# Patient Record
Sex: FEMALE | Race: OTHER
[De-identification: names, ages, dates, MRNs, and addresses within clinical notes are randomized per-mention and may not be internally consistent; named-entity substitution may affect disease eponyms.]

---

## 2020-11-02 ENCOUNTER — HOSPITAL ENCOUNTER (EMERGENCY)
Dept: HOSPITAL 72 - EMR | Age: 60
Discharge: HOME | End: 2020-11-02
Payer: MEDICARE

## 2020-11-02 VITALS — BODY MASS INDEX: 36.8 KG/M2 | HEIGHT: 62 IN | WEIGHT: 200 LBS

## 2020-11-02 VITALS — DIASTOLIC BLOOD PRESSURE: 73 MMHG | SYSTOLIC BLOOD PRESSURE: 131 MMHG

## 2020-11-02 VITALS — SYSTOLIC BLOOD PRESSURE: 140 MMHG | DIASTOLIC BLOOD PRESSURE: 83 MMHG

## 2020-11-02 DIAGNOSIS — N83.202: ICD-10-CM

## 2020-11-02 DIAGNOSIS — K57.92: Primary | ICD-10-CM

## 2020-11-02 DIAGNOSIS — Z99.2: ICD-10-CM

## 2020-11-02 DIAGNOSIS — M54.9: ICD-10-CM

## 2020-11-02 DIAGNOSIS — N18.6: ICD-10-CM

## 2020-11-02 DIAGNOSIS — E66.9: ICD-10-CM

## 2020-11-02 LAB
ADD MANUAL DIFF: NO
ALBUMIN SERPL-MCNC: 3.6 G/DL (ref 3.4–5)
ALBUMIN/GLOB SERPL: 0.8 {RATIO} (ref 1–2.7)
ALP SERPL-CCNC: 99 U/L (ref 46–116)
ALT SERPL-CCNC: 15 U/L (ref 12–78)
ANION GAP SERPL CALC-SCNC: 13 MMOL/L (ref 5–15)
AST SERPL-CCNC: 15 U/L (ref 15–37)
BASOPHILS NFR BLD AUTO: 0.8 % (ref 0–2)
BILIRUB SERPL-MCNC: 0.5 MG/DL (ref 0.2–1)
BUN SERPL-MCNC: 52 MG/DL (ref 7–18)
CALCIUM SERPL-MCNC: 8.9 MG/DL (ref 8.5–10.1)
CHLORIDE SERPL-SCNC: 93 MMOL/L (ref 98–107)
CO2 SERPL-SCNC: 26 MMOL/L (ref 21–32)
CREAT SERPL-MCNC: 9.8 MG/DL (ref 0.55–1.3)
EOSINOPHIL NFR BLD AUTO: 1.4 % (ref 0–3)
ERYTHROCYTE [DISTWIDTH] IN BLOOD BY AUTOMATED COUNT: 13.8 % (ref 11.6–14.8)
GLOBULIN SER-MCNC: 4.4 G/DL
HCT VFR BLD CALC: 45.4 % (ref 37–47)
HGB BLD-MCNC: 14.1 G/DL (ref 12–16)
LYMPHOCYTES NFR BLD AUTO: 20.8 % (ref 20–45)
MCV RBC AUTO: 103 FL (ref 80–99)
MONOCYTES NFR BLD AUTO: 7.6 % (ref 1–10)
NEUTROPHILS NFR BLD AUTO: 69.4 % (ref 45–75)
PLATELET # BLD: 248 K/UL (ref 150–450)
POTASSIUM SERPL-SCNC: 4.3 MMOL/L (ref 3.5–5.1)
RBC # BLD AUTO: 4.42 M/UL (ref 4.2–5.4)
SODIUM SERPL-SCNC: 132 MMOL/L (ref 136–145)
WBC # BLD AUTO: 11 K/UL (ref 4.8–10.8)

## 2020-11-02 PROCEDURE — 36415 COLL VENOUS BLD VENIPUNCTURE: CPT

## 2020-11-02 PROCEDURE — 96374 THER/PROPH/DIAG INJ IV PUSH: CPT

## 2020-11-02 PROCEDURE — 85025 COMPLETE CBC W/AUTO DIFF WBC: CPT

## 2020-11-02 PROCEDURE — 99284 EMERGENCY DEPT VISIT MOD MDM: CPT

## 2020-11-02 PROCEDURE — 80053 COMPREHEN METABOLIC PANEL: CPT

## 2020-11-02 PROCEDURE — 93005 ELECTROCARDIOGRAM TRACING: CPT

## 2020-11-02 PROCEDURE — 74177 CT ABD & PELVIS W/CONTRAST: CPT

## 2020-11-02 NOTE — DIAGNOSTIC IMAGING REPORT
EXAM:

  CT Abdomen and Pelvis With Intravenous Contrast

 

CLINICAL HISTORY:

  PAIN

 

TECHNIQUE:

  Axial computed tomography images of the abdomen and pelvis with 

intravenous contrast.  CTDI is 27.9 mGy and DLP is 1568.4 mGy-cm.  One or 

more of the following dose reduction techniques were used: automated 

exposure control, adjustment of the mA and/or kV according to patient 

size, use of iterative reconstruction technique.

 

COMPARISON:

  No relevant prior studies available.

 

FINDINGS:

  Lung bases:  Bibasilar atelectasis.

 

 ABDOMEN:

  Liver:  Unremarkable.  No mass.

  Gallbladder and bile ducts:  Prior cholecystectomy.  No ductal dilation.

 

  Pancreas:  Unremarkable.  No mass.  No ductal dilation.

  Spleen:  Unremarkable.  No splenomegaly.

  Adrenals:  Unremarkable.  No mass.

  Kidneys and ureters:  Bilateral renal cortical thinning.  2.8 x 2.1 x 3.

5 cm peripherally calcified cystic lesion of the left inferior kidney.  

No hydronephrosis.

  Stomach and bowel:  Diverticulitis, most prominent within the sigmoid 

colon.  There is very subtle inflammatory change of the proximal sigmoid 

colon adjacent to diverticula.  No obstruction.

 

 PELVIS:

  Appendix:  Normal appendix.

  Bladder:  Unremarkable.  No mass.

  Reproductive:  Unremarkable as visualized.

 

 ABDOMEN and PELVIS:

  Intraperitoneal space:  Unremarkable.  No free air.  No significant 

fluid collection.

  Bones/joints:  Multilevel degenerative changes of the visualized 

thoracic and lumbar spine.  No acute fracture.  No dislocation.

  Soft tissues:  Unremarkable.

  Vasculature:  Unremarkable.  No abdominal aortic aneurysm.

  Lymph nodes:  Unremarkable.  No enlarged lymph nodes.

 

IMPRESSION:     

1.  Diverticulosis with subtle pericolonic inflammatory change of the 

proximal sigmoid colon concerning for early diverticulitis.  No 

pneumoperitoneum or abscess formation.

2.  3.5 cm calcified complex cystic lesion of the left kidney.  Consider 

nonemergent renal ultrasound for further evaluation.

## 2020-11-02 NOTE — NUR
ED Nurse Note:



Patient brought into ED from home by MAYELIN  for c/o nontraumatic back 
pain. Per EMS, pt had a fall over a year ago, but denies any fall or injury 
today that would cause the back pain. Dialysis shunt noted to pt L arm. Patient 
appears to be in mild distress due to pain, she is moaning/crying and grasping 
at site. No obvious bruising or wound noted to pts back. She is aaox4, 
breathing is normal and unlabored. Pt connected to cardiac monitor. Safety 
measures in place. Will continue to monitor.

## 2020-11-02 NOTE — EMERGENCY ROOM REPORT
History of Present Illness


General


Chief Complaint:  Back Pain-No Injury





Present Illness


HPI


60-year-old female end-stage renal disease on dialysis Tuesday Thursday Saturday

here with right-sided flank pain for 3 days.  Patient says that she has been 

having right-sided flank pain radiating to her right lower quadrant.  Pain is 

dull in nature and does not otherwise radiate.  Has not take anything for the 

pain.  No fevers, chills, chest pain, palpitation, shortness of breath, other 

back pain, abdominal pain, nausea, vomiting, diarrhea.  Patient does not make 

urine.


Allergies:  


Coded Allergies:  


     No Known Allergies (Unverified , 11/2/20)





COVID-19 Screening


Contact w/high risk pt:  No


Experienced COVID-19 symptoms?:  No


COVID-19 Testing performed PTA:  No





Patient History


Pregnant Now:  No





Review of Systems


All Other Systems:  negative except mentioned in HPI





Physical Exam





Vital Signs








  Date Time  Temp Pulse Resp B/P (MAP) Pulse Ox O2 Delivery O2 Flow Rate FiO2


 


11/2/20 19:57 98.8 93 20 146/84 (104) 98 Room Air  








Sp02 EP Interpretation:  reviewed, normal


General Appearance:  other - Morbidly obese.  Writhing in pain clutching her 

right flank


Head:  normocephalic, atraumatic


Eyes:  bilateral eye normal inspection, bilateral eye PERRL


ENT:  hearing grossly normal, normal pharynx, no angioedema, normal voice


Neck:  full range of motion, supple/symm/no masses


Respiratory:  chest non-tender, lungs clear, normal breath sounds, speaking full

sentences


Cardiovascular #1:  regular rate, rhythm, no edema


Cardiovascular #2:  2+ carotid (R), 2+ carotid (L), 2+ radial (R), 2+ radial 

(L), 2+ dorsalis pedis (R), 2+ dorsalis pedis (L)


Gastrointestinal:  normal bowel sounds, soft, non-distended, no guarding, no 

rebound, other - Right CVA tenderness.  Or other quadrant abdominal tenderness 

on palpation.  No rebound or guarding


Rectal:  deferred


Genitourinary:  normal inspection, no CVA tenderness


Musculoskeletal:  back normal, normal range of motion, gait/station normal, non-

tender


Neurologic:  alert, motor strength/tone normal, oriented x3, sensory intact, 

responsive, speech normal


Psychiatric:  judgement/insight normal, memory normal, mood/affect normal, no 

suicidal/homicidal ideation


Lymphatic:  no adenopathy





Medical Decision Making


Diagnostic Impression:  


   Primary Impression:  


   Diverticulitis


   Additional Impression:  


   Back pain


ER Course





Laboratory Tests








Test


 11/2/20


20:05


 


White Blood Count


 11.0 K/UL


(4.8-10.8)  H


 


Red Blood Count


 4.42 M/UL


(4.20-5.40)


 


Hemoglobin


 14.1 G/DL


(12.0-16.0)


 


Hematocrit


 45.4 %


(37.0-47.0)


 


Mean Corpuscular Volume


 103 FL (80-99)


H


 


Mean Corpuscular Hemoglobin


 31.9 PG


(27.0-31.0)  H


 


Mean Corpuscular Hemoglobin


Concent 31.0 G/DL


(32.0-36.0)  L


 


Red Cell Distribution Width


 13.8 %


(11.6-14.8)


 


Platelet Count


 248 K/UL


(150-450)


 


Mean Platelet Volume


 7.2 FL


(6.5-10.1)


 


Neutrophils (%) (Auto)


 69.4 %


(45.0-75.0)


 


Lymphocytes (%) (Auto)


 20.8 %


(20.0-45.0)


 


Monocytes (%) (Auto)


 7.6 %


(1.0-10.0)


 


Eosinophils (%) (Auto)


 1.4 %


(0.0-3.0)


 


Basophils (%) (Auto)


 0.8 %


(0.0-2.0)


 


Sodium Level


 132 MMOL/L


(136-145)  L


 


Potassium Level


 4.3 MMOL/L


(3.5-5.1)


 


Chloride Level


 93 MMOL/L


()  L


 


Carbon Dioxide Level


 26 MMOL/L


(21-32)


 


Anion Gap


 13 mmol/L


(5-15)


 


Blood Urea Nitrogen


 52 mg/dL


(7-18)  H


 


Creatinine


 9.8 MG/DL


(0.55-1.30)  H


 


Estimated Glomerular


Filtration Rate 4.1 mL/min


(>60)


 


Glucose Level


 164 MG/DL


()  H


 


Calcium Level


 8.9 MG/DL


(8.5-10.1)


 


Total Bilirubin


 0.5 MG/DL


(0.2-1.0)


 


Aspartate Amino Transferase


(AST) 15 U/L (15-37)





 


Alanine Aminotransferase (ALT)


 15 U/L (12-78)





 


Alkaline Phosphatase


 99 U/L


()


 


Total Protein


 8.0 G/DL


(6.4-8.2)


 


Albumin


 3.6 G/DL


(3.4-5.0)


 


Globulin 4.4 g/dL  


 


Albumin/Globulin Ratio


 0.8 (1.0-2.7)


L











CT abd pel: IMPRESSION:     


1.  Diverticulosis with subtle pericolonic inflammatory change of the 


proximal sigmoid colon concerning for early diverticulitis.  No 


pneumoperitoneum or abscess formation.


2.  3.5 cm calcified complex cystic lesion of the left kidney.  Consider 


nonemergent renal ultrasound for further evaluation.


 





60-year-old female with history of end-stage renal disease on dialysis Monday Wednesday Friday here with atraumatic lower back pain.  Patient also told me 

that she has been having some loose stool over the past 48 hours.  She was 

hemodynamically stable and had normal vital signs in the emergency department.  

She was given 1 dose of morphine with good resolution of her pain.  She was able

to ambulate throughout the ER when she received pain medications.  CBC and CMP 

were largely unremarkable.  Normal electrolytes.  CT abdomen pelvis did show 

evidence of early diverticulitis that was uncomplicated without perforation or 

abscess formation.  Patient was given a prescription for ciprofloxacin, Flagyl 

and Norco as needed for pain.  Told to come back to the emergency department if 

she has any fevers, chills, worsening pain despite these measures.  She 

expressed understanding and was discharged.





Last Vital Signs








  Date Time  Temp Pulse Resp B/P (MAP) Pulse Ox O2 Delivery O2 Flow Rate FiO2


 


11/2/20 20:00 98.8 85 20 140/83 98 Room Air  








Scripts


Hydrocodone Bit/Acetaminophen 5-325* (NORCO 5-325 TABLET*) 1 Each Tablet


1 TAB ORAL Q4H PRN for For Pain, #10 TAB


   Prov: Manoj Elizabeth M.D.         11/2/20 


Metronidazole* (FLAGYL*) 500 Mg Tablet


500 MG ORAL THREE TIMES A DAY, #21 TAB


   Prov: Manoj Elizabeth M.D.         11/2/20 


Ciprofloxacin Hcl* (CIPROFLOXACIN HCL*) 500 Mg Tablet


500 MG ORAL Q12H, #14 TAB 0 Refills


   Prov: Manoj Elizabeth M.D.         11/2/20











Manoj Elizabeth M.D.                 Nov 2, 2020 20:40

## 2020-11-04 NOTE — CARDIOLOGY REPORT
--------------- APPROVED REPORT --------------





EKG Measurement

Heart [id number]

DE [id number]

[id number]

[id number]

[id number]



<Conclusion>

Normal sinus rhythm

Low voltage QRS

Cannot rule out Anterior infarct, age undetermined

Abnormal ECG